# Patient Record
Sex: FEMALE | Race: WHITE | NOT HISPANIC OR LATINO | URBAN - METROPOLITAN AREA
[De-identification: names, ages, dates, MRNs, and addresses within clinical notes are randomized per-mention and may not be internally consistent; named-entity substitution may affect disease eponyms.]

---

## 2023-03-09 DIAGNOSIS — R92.8 ABNORMAL MAMMOGRAM: ICD-10-CM

## 2023-03-09 DIAGNOSIS — M25.569 KNEE PAIN, UNSPECIFIED CHRONICITY, UNSPECIFIED LATERALITY: ICD-10-CM

## 2023-03-09 DIAGNOSIS — R91.8 PULMONARY NODULES/LESIONS, MULTIPLE: Primary | ICD-10-CM

## 2023-03-15 DIAGNOSIS — Z00.00 HEALTH MAINTENANCE EXAMINATION: Primary | ICD-10-CM

## 2023-05-01 ENCOUNTER — LAB (OUTPATIENT)
Dept: LAB | Facility: LAB | Age: 73
End: 2023-05-01
Payer: MEDICARE

## 2023-05-01 DIAGNOSIS — Z00.00 HEALTH MAINTENANCE EXAMINATION: ICD-10-CM

## 2023-05-01 LAB
ALANINE AMINOTRANSFERASE (SGPT) (U/L) IN SER/PLAS: 14 U/L (ref 7–45)
ALBUMIN (G/DL) IN SER/PLAS: 4.5 G/DL (ref 3.4–5)
ALKALINE PHOSPHATASE (U/L) IN SER/PLAS: 76 U/L (ref 33–136)
ANION GAP IN SER/PLAS: 12 MMOL/L (ref 10–20)
ASPARTATE AMINOTRANSFERASE (SGOT) (U/L) IN SER/PLAS: 21 U/L (ref 9–39)
BASOPHILS (10*3/UL) IN BLOOD BY AUTOMATED COUNT: 0.04 X10E9/L (ref 0–0.1)
BASOPHILS/100 LEUKOCYTES IN BLOOD BY AUTOMATED COUNT: 1.1 % (ref 0–2)
BILIRUBIN TOTAL (MG/DL) IN SER/PLAS: 0.5 MG/DL (ref 0–1.2)
C REACTIVE PROTEIN (MG/L) IN SER/PLAS: <0.1 MG/DL
CALCIDIOL (25 OH VITAMIN D3) (NG/ML) IN SER/PLAS: 83 NG/ML
CALCIUM (MG/DL) IN SER/PLAS: 9.4 MG/DL (ref 8.6–10.6)
CARBON DIOXIDE, TOTAL (MMOL/L) IN SER/PLAS: 30 MMOL/L (ref 21–32)
CHLORIDE (MMOL/L) IN SER/PLAS: 103 MMOL/L (ref 98–107)
CHOLESTEROL (MG/DL) IN SER/PLAS: 242 MG/DL (ref 0–199)
CHOLESTEROL IN HDL (MG/DL) IN SER/PLAS: 81.3 MG/DL
CHOLESTEROL/HDL RATIO: 3
CREATININE (MG/DL) IN SER/PLAS: 0.75 MG/DL (ref 0.5–1.05)
EOSINOPHILS (10*3/UL) IN BLOOD BY AUTOMATED COUNT: 0.07 X10E9/L (ref 0–0.4)
EOSINOPHILS/100 LEUKOCYTES IN BLOOD BY AUTOMATED COUNT: 1.9 % (ref 0–6)
ERYTHROCYTE DISTRIBUTION WIDTH (RATIO) BY AUTOMATED COUNT: 12.5 % (ref 11.5–14.5)
ERYTHROCYTE MEAN CORPUSCULAR HEMOGLOBIN CONCENTRATION (G/DL) BY AUTOMATED: 32.4 G/DL (ref 32–36)
ERYTHROCYTE MEAN CORPUSCULAR VOLUME (FL) BY AUTOMATED COUNT: 96 FL (ref 80–100)
ERYTHROCYTES (10*6/UL) IN BLOOD BY AUTOMATED COUNT: 4.24 X10E12/L (ref 4–5.2)
GFR FEMALE: 84 ML/MIN/1.73M2
GLUCOSE (MG/DL) IN SER/PLAS: 85 MG/DL (ref 74–99)
HEMATOCRIT (%) IN BLOOD BY AUTOMATED COUNT: 40.7 % (ref 36–46)
HEMOGLOBIN (G/DL) IN BLOOD: 13.2 G/DL (ref 12–16)
IMMATURE GRANULOCYTES/100 LEUKOCYTES IN BLOOD BY AUTOMATED COUNT: 0.3 % (ref 0–0.9)
LDL: 137 MG/DL (ref 0–99)
LEUKOCYTES (10*3/UL) IN BLOOD BY AUTOMATED COUNT: 3.7 X10E9/L (ref 4.4–11.3)
LYMPHOCYTES (10*3/UL) IN BLOOD BY AUTOMATED COUNT: 1.3 X10E9/L (ref 0.8–3)
LYMPHOCYTES/100 LEUKOCYTES IN BLOOD BY AUTOMATED COUNT: 35.5 % (ref 13–44)
MONOCYTES (10*3/UL) IN BLOOD BY AUTOMATED COUNT: 0.24 X10E9/L (ref 0.05–0.8)
MONOCYTES/100 LEUKOCYTES IN BLOOD BY AUTOMATED COUNT: 6.6 % (ref 2–10)
MUCUS, URINE: NORMAL /LPF
NEUTROPHILS (10*3/UL) IN BLOOD BY AUTOMATED COUNT: 2 X10E9/L (ref 1.6–5.5)
NEUTROPHILS/100 LEUKOCYTES IN BLOOD BY AUTOMATED COUNT: 54.6 % (ref 40–80)
NRBC (PER 100 WBCS) BY AUTOMATED COUNT: 0 /100 WBC (ref 0–0)
PLATELETS (10*3/UL) IN BLOOD AUTOMATED COUNT: 222 X10E9/L (ref 150–450)
POTASSIUM (MMOL/L) IN SER/PLAS: 4.4 MMOL/L (ref 3.5–5.3)
PROTEIN TOTAL: 7.1 G/DL (ref 6.4–8.2)
RBC, URINE: 1 /HPF (ref 0–5)
SODIUM (MMOL/L) IN SER/PLAS: 141 MMOL/L (ref 136–145)
THYROTROPIN (MIU/L) IN SER/PLAS BY DETECTION LIMIT <= 0.05 MIU/L: 3.49 MIU/L (ref 0.44–3.98)
TRIGLYCERIDE (MG/DL) IN SER/PLAS: 119 MG/DL (ref 0–149)
UREA NITROGEN (MG/DL) IN SER/PLAS: 14 MG/DL (ref 6–23)
VLDL: 24 MG/DL (ref 0–40)
WBC, URINE: <1 /HPF (ref 0–5)

## 2023-05-01 PROCEDURE — 84443 ASSAY THYROID STIM HORMONE: CPT

## 2023-05-01 PROCEDURE — 85025 COMPLETE CBC W/AUTO DIFF WBC: CPT

## 2023-05-01 PROCEDURE — 86140 C-REACTIVE PROTEIN: CPT

## 2023-05-01 PROCEDURE — 81001 URINALYSIS AUTO W/SCOPE: CPT

## 2023-05-01 PROCEDURE — 80053 COMPREHEN METABOLIC PANEL: CPT

## 2023-05-01 PROCEDURE — 36415 COLL VENOUS BLD VENIPUNCTURE: CPT

## 2023-05-01 PROCEDURE — 80061 LIPID PANEL: CPT

## 2023-05-01 PROCEDURE — 82306 VITAMIN D 25 HYDROXY: CPT

## 2023-05-04 PROBLEM — Z00.00 ANNUAL PHYSICAL EXAM: Status: ACTIVE | Noted: 2023-05-04

## 2023-05-04 PROBLEM — M54.50 CHRONIC LOW BACK PAIN: Status: ACTIVE | Noted: 2019-03-04

## 2023-05-04 PROBLEM — M17.9 OSTEOARTHRITIS OF KNEE: Status: RESOLVED | Noted: 2023-05-04 | Resolved: 2023-05-04

## 2023-05-04 PROBLEM — M85.80 OSTEOPENIA: Status: ACTIVE | Noted: 2023-05-04

## 2023-05-04 PROBLEM — F98.8 ADD (ATTENTION DEFICIT DISORDER): Status: ACTIVE | Noted: 2023-05-04

## 2023-05-04 PROBLEM — N95.2 ATROPHIC VAGINITIS: Status: ACTIVE | Noted: 2023-05-04

## 2023-05-04 PROBLEM — E53.8 VITAMIN B12 DEFICIENCY: Status: ACTIVE | Noted: 2023-05-04

## 2023-05-04 PROBLEM — G89.29 CHRONIC LOW BACK PAIN: Status: ACTIVE | Noted: 2019-03-04

## 2023-05-04 PROBLEM — E78.5 HYPERLIPIDEMIA: Status: ACTIVE | Noted: 2023-05-04

## 2023-05-04 PROBLEM — F41.9 ANXIETY DISORDER: Status: ACTIVE | Noted: 2023-05-04

## 2023-05-04 PROBLEM — N36.2 URETHRAL CARUNCLE: Status: RESOLVED | Noted: 2021-07-27 | Resolved: 2023-05-04

## 2023-05-04 RX ORDER — SERTRALINE HYDROCHLORIDE 50 MG/1
50 TABLET, FILM COATED ORAL
COMMUNITY
Start: 2022-08-29 | End: 2024-05-20 | Stop reason: ALTCHOICE

## 2023-05-04 RX ORDER — CYANOCOBALAMIN 1000 UG/ML
1000 INJECTION, SOLUTION INTRAMUSCULAR; SUBCUTANEOUS
COMMUNITY

## 2023-05-04 RX ORDER — MV-MIN/FOLIC/K1/LYCOPEN/LUTEIN 300-60 MCG
1 TABLET ORAL DAILY
COMMUNITY
Start: 2010-08-17 | End: 2024-05-20 | Stop reason: ALTCHOICE

## 2023-05-04 RX ORDER — ESTRADIOL 0.1 MG/G
2 CREAM VAGINAL 2 TIMES WEEKLY
COMMUNITY
Start: 2020-08-13

## 2023-05-04 RX ORDER — BUPROPION HYDROCHLORIDE 150 MG/1
150 TABLET ORAL DAILY
COMMUNITY
End: 2023-11-21

## 2023-05-05 ENCOUNTER — OFFICE VISIT (OUTPATIENT)
Dept: PRIMARY CARE | Facility: CLINIC | Age: 73
End: 2023-05-05
Payer: MEDICARE

## 2023-05-05 VITALS
HEART RATE: 72 BPM | TEMPERATURE: 97.6 F | HEIGHT: 72 IN | BODY MASS INDEX: 20.52 KG/M2 | WEIGHT: 151.5 LBS | DIASTOLIC BLOOD PRESSURE: 68 MMHG | OXYGEN SATURATION: 98 % | SYSTOLIC BLOOD PRESSURE: 110 MMHG

## 2023-05-05 DIAGNOSIS — M85.80 OSTEOPENIA, UNSPECIFIED LOCATION: ICD-10-CM

## 2023-05-05 DIAGNOSIS — Z00.00 ANNUAL PHYSICAL EXAM: Primary | ICD-10-CM

## 2023-05-05 DIAGNOSIS — E78.5 HYPERLIPIDEMIA, UNSPECIFIED HYPERLIPIDEMIA TYPE: ICD-10-CM

## 2023-05-05 PROBLEM — F98.8 ADD (ATTENTION DEFICIT DISORDER): Status: RESOLVED | Noted: 2023-05-04 | Resolved: 2023-05-05

## 2023-05-05 PROBLEM — E53.8 VITAMIN B12 DEFICIENCY: Status: RESOLVED | Noted: 2023-05-04 | Resolved: 2023-05-05

## 2023-05-05 PROBLEM — F41.9 ANXIETY DISORDER: Status: RESOLVED | Noted: 2023-05-04 | Resolved: 2023-05-05

## 2023-05-05 PROBLEM — M54.50 CHRONIC LOW BACK PAIN: Status: RESOLVED | Noted: 2019-03-04 | Resolved: 2023-05-05

## 2023-05-05 PROBLEM — G89.29 CHRONIC LOW BACK PAIN: Status: RESOLVED | Noted: 2019-03-04 | Resolved: 2023-05-05

## 2023-05-05 PROCEDURE — 1160F RVW MEDS BY RX/DR IN RCRD: CPT | Performed by: INTERNAL MEDICINE

## 2023-05-05 PROCEDURE — UHSPHYS PR UH SELECT PHYSICAL: Performed by: INTERNAL MEDICINE

## 2023-05-05 PROCEDURE — 1159F MED LIST DOCD IN RCRD: CPT | Performed by: INTERNAL MEDICINE

## 2023-05-05 PROCEDURE — 1036F TOBACCO NON-USER: CPT | Performed by: INTERNAL MEDICINE

## 2023-05-05 NOTE — PROGRESS NOTES
"Physical Exam    Name Annie Golden    Date of Service :5/5/2023    Annie Golden is a 72 y.o. year old female who is being seen for a comprehensive exam    Her main health concerns today are:    Has had a couple of nose bleeds this year   Urethral carbuncle that was bleeding and had full   Saw Dr. Krueger for \"knocked knees\"   advised to hold off on any procedure for now on left knee   Saw Dr. 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  Last year was stressful from that standpoint     Patient Active Problem List   Diagnosis    Osteopenia    Hyperlipidemia    Atrophic vaginitis    Annual physical exam        Past Medical History:   Diagnosis Date    Abnormal uterine and vaginal bleeding, unspecified     ADD (attention deficit disorder) 05/04/2023    Anxiety disorder 05/04/2023    Chronic low back pain 03/04/2019    Osteoarthritis of knee     Urethral caruncle         History reviewed. No pertinent surgical history.     Social History     Tobacco Use    Smoking status: Never    Smokeless tobacco: Never   Substance Use Topics    Alcohol use: Yes     Alcohol/week: 3.0 standard drinks of alcohol     Types: 3 Glasses of wine per week    Drug use: Never      Social History     Social History Narrative     age 21,  to [place]ardo, 2 adult sons. 40,  41 both [employer] is family business. she was ex.  of [employer]. One son is going to leave the [employer].     spends the summer in [place]. late May and returns late October/early November     one son in [facility] and one in [place].  three granddaughters range from age 2 years to 8 years     lifelong non smoker    wine drinker with dinner     exercise routine- she stretches and walks regularly     Diet - balanced. low carbs, low snacking, lots of fish and chicken. fruits and vegetables          Family History   Problem Relation Name Age of Onset    Epilepsy Mother          " "d.96    Heart failure Mother      Schizophrenia Mother      Osteoporosis Mother      Hepatitis Father          d. 59 ( not alcohol)    Stroke Maternal Grandmother          d. 99    Stroke Maternal Grandfather      Heart attack Maternal Grandfather          d. 88            Current Outpatient Medications:     B complex-vitamin C-folic acid (Nephro-Nathan Rx) 1- mg-mg-mcg tablet, Take 1 tablet by mouth once daily with a meal., Disp: , Rfl:     buPROPion XL (Wellbutrin XL) 150 mg 24 hr tablet, Take 1 tablet (150 mg) by mouth once daily., Disp: , Rfl:     cyanocobalamin (Vitamin B-12) 1,000 mcg/mL injection, Inject 1 mL (1,000 mcg) into the shoulder, thigh, or buttocks every 30 (thirty) days., Disp: , Rfl:     L. acidophilus/Bifid. animalis 32 billion cell capsule, Take 1 Capful by mouth once daily., Disp: , Rfl:     multivit-min-FA-lycopen-lutein (Centrum Silver Ultra Men's) 300-600-300 mcg tablet, Take 1 tablet by mouth once daily., Disp: , Rfl:     sertraline (Zoloft) 50 mg tablet, Take 1 tablet (50 mg) by mouth once daily., Disp: , Rfl:     estradiol (Estrace) 0.01 % (0.1 mg/gram) vaginal cream, Insert 20 Applications into the vagina 2 times a week., Disp: , Rfl:     zoster vaccine-recombinant adjuvanted (Shingrix) 50 mcg/0.5 mL vaccine, Inject 0.5 mL into the shoulder, thigh, or buttocks 1 time for 1 dose., Disp: 0.5 mL, Rfl: 0    No Known Allergies    Review of Systems  sleeping well, mood okay, appetite good, weight stable, no cp, sob some recurrent nose bleeds this year     /68 (BP Location: Left arm, Patient Position: Sitting)   Pulse 72   Temp 36.4 °C (97.6 °F)   Ht 1.854 m (6' 1\")   Wt 68.7 kg (151 lb 8 oz)   SpO2 98%   BMI 19.99 kg/m²  Body mass index is 19.99 kg/m².    Physical Exam  Vitals reviewed.   Constitutional:       General: She is not in acute distress.     Appearance: Normal appearance. She is not ill-appearing.   HENT:      Right Ear: Tympanic membrane, ear canal and external ear " normal.      Left Ear: Tympanic membrane, ear canal and external ear normal.      Mouth/Throat:      Mouth: Mucous membranes are moist.      Pharynx: No oropharyngeal exudate or posterior oropharyngeal erythema.   Eyes:      Extraocular Movements: Extraocular movements intact.      Conjunctiva/sclera: Conjunctivae normal.      Pupils: Pupils are equal, round, and reactive to light.   Neck:      Vascular: No carotid bruit.   Cardiovascular:      Rate and Rhythm: Normal rate and regular rhythm.      Pulses: Normal pulses.      Heart sounds: Normal heart sounds. No murmur heard.     No gallop.   Pulmonary:      Effort: Pulmonary effort is normal. No respiratory distress.      Breath sounds: Normal breath sounds. No wheezing, rhonchi or rales.   Chest:   Breasts:     Right: Normal. No mass, nipple discharge or skin change.      Left: No mass, nipple discharge or skin change.   Abdominal:      General: Bowel sounds are normal. There is no distension.      Palpations: There is no mass.      Tenderness: There is no abdominal tenderness. There is no guarding.   Genitourinary:     Adnexa:         Right: No mass.          Left: No mass.     Musculoskeletal:         General: No swelling or tenderness. Normal range of motion.      Right lower leg: No edema.      Left lower leg: No edema.   Lymphadenopathy:      Cervical: No cervical adenopathy.      Upper Body:      Right upper body: No supraclavicular or axillary adenopathy.      Left upper body: No supraclavicular or axillary adenopathy.      Lower Body: No right inguinal adenopathy. No left inguinal adenopathy.   Skin:     General: Skin is warm and dry.      Findings: No rash.      Comments: No suspicious rashes  There is a scaly recurring lesion on her left shin    Neurological:      General: No focal deficit present.      Mental Status: She is alert.   Psychiatric:         Mood and Affect: Mood normal.         RESULTS/DATA:  Reviewed Standard Labs for this physical with  patient ( any significant issues addressed in A/P )     ECG:  Sinus Rhythm  RSR in V2      Assessment/Plan   Doing well overall  Discussed increasing aerobic exercise and following a heart healthy diet  She will obtain her shingles vaccine at the pharmacy and arrange to update her annual GYN exam    Advised her to get her skin lesion in her left shin biopsied.     Problem List Items Addressed This Visit       Osteopenia     Continue calcium, vitamin D and strength training          Hyperlipidemia     Discussed lifestyle changes  including increasing aerobic exercise  Will re check lipids in 6 months          Annual physical exam - Primary     Mammogram 4/27/23 Neg   Colonoscopy 5/10/22  No specimens collected repeat 10 years   Bone density- 3/27/22  lowest T score -1.4  left femoral neck   Pap test- per meet  will follow up     Shingles due   Update COVID booster in the fall   Annual Skin exam- up to date  Dentist- current   had root canal 6 months ( nose bleed)   Ophthalmologist- has appointment this afternoon Dr Romo          Relevant Medications    zoster vaccine-recombinant adjuvanted (Shingrix) 50 mcg/0.5 mL vaccine         Mana Rasmussen MD

## 2023-05-05 NOTE — PATIENT INSTRUCTIONS
Please update your shingles vaccine series at the pharmacy   Plan on updating your covid booster in the fall   Schedule with Dr. Leon for follow up     For management of your cholesterol and Blood pressure you should aim to follow the DASH diet. The DASH diet is  a combination diet rich in fruits, vegetables, legumes, and low-fat dairy products and low in snacks, sweets, meats, and saturated and total fat. The DASH diet is comprised of four to five servings of fruit, four to five servings of vegetables, two to three servings of low-fat dairy per day, and <25 percent fat.    Try to increase your aerobic exercise   Return in fall for recheck on your cholesterol     I would advise your recurring scaly skin lesion on your leg get biopsied and removed.

## 2023-05-05 NOTE — ASSESSMENT & PLAN NOTE
Discussed lifestyle changes  including increasing aerobic exercise  Will re check lipids in 6 months

## 2023-05-05 NOTE — ASSESSMENT & PLAN NOTE
Mammogram 4/27/23 Neg   Colonoscopy 5/10/22  No specimens collected repeat 10 years   Bone density- 3/27/22  lowest T score -1.4  left femoral neck   Pap test- per meet  will follow up     Shingles due   Update COVID booster in the fall   Annual Skin exam- up to date  Dentist- current   had root canal 6 months ( nose bleed)   Ophthalmologist- has appointment this afternoon Dr Romo

## 2023-10-30 PROBLEM — R91.8 MULTIPLE PULMONARY NODULES: Status: ACTIVE | Noted: 2023-10-30

## 2023-10-30 RX ORDER — CHOLECALCIFEROL (VITAMIN D3) 125 MCG
CAPSULE ORAL
COMMUNITY

## 2023-11-21 DIAGNOSIS — F41.9 ANXIETY DISORDER, UNSPECIFIED: ICD-10-CM

## 2023-11-21 RX ORDER — BUPROPION HYDROCHLORIDE 150 MG/1
150 TABLET ORAL DAILY
Qty: 90 TABLET | Refills: 3 | Status: SHIPPED | OUTPATIENT
Start: 2023-11-21

## 2024-01-22 ENCOUNTER — TELEPHONE (OUTPATIENT)
Dept: PRIMARY CARE | Facility: CLINIC | Age: 74
End: 2024-01-22
Payer: MEDICARE

## 2024-01-22 DIAGNOSIS — Z71.84 TRAVEL ADVICE ENCOUNTER: Primary | ICD-10-CM

## 2024-01-22 DIAGNOSIS — E78.5 HYPERLIPIDEMIA, UNSPECIFIED HYPERLIPIDEMIA TYPE: Primary | ICD-10-CM

## 2024-01-22 RX ORDER — OSELTAMIVIR PHOSPHATE 75 MG/1
75 CAPSULE ORAL 2 TIMES DAILY
Qty: 10 CAPSULE | Refills: 0 | Status: SHIPPED | OUTPATIENT
Start: 2024-01-22 | End: 2024-01-27

## 2024-01-22 RX ORDER — DIPHENOXYLATE HYDROCHLORIDE AND ATROPINE SULFATE 2.5; .025 MG/1; MG/1
1 TABLET ORAL 4 TIMES DAILY PRN
Qty: 21 TABLET | Refills: 0 | Status: SHIPPED | OUTPATIENT
Start: 2024-01-22 | End: 2024-01-29

## 2024-01-22 RX ORDER — AZITHROMYCIN 250 MG/1
TABLET, FILM COATED ORAL
Qty: 6 TABLET | Refills: 0 | Status: SHIPPED | OUTPATIENT
Start: 2024-01-22 | End: 2024-01-27

## 2024-01-22 NOTE — TELEPHONE ENCOUNTER
Hi Dr. Rasmussen,    1) Finished all my vaccinations. Got RSV today so necessary vaccinations up to date.   Covid booster, Shingles, Flu and RSV    2) About to travel to [place] Feb 9-Mar 10. When I plan to be out of the country for an extended time period I always like to have a couple “just in case meds” with me.   Tamaflu   Paxlovid?   Antibiotic   Severe stomach/digestive distress    Are these items you think I should have with me just in case and if so can you prescribe for me?    3) Heel pain just started up Isreal 15. Self diagnosed with plantar fasciitis (lol) Not severe pain just annoying ache. Worse in AM. Should I see podiatrist before I go? Worried since I anticipate I will do a lot of walking on my upcoming  trip. You sent me to a podiatrist awhile back for hammer toe pain which is better now. Can't find the referral name anywhere! Address is [address] [place] [address] [address] female doctor but no name in my calendar.    4) When I was in for my last physical you suggested adding statins since my cholesterol was inching up. Since I was reluctant to start those we agreed I would do blood work again in November which was never scheduled. So should I still do that now or wait until my annual physical early May (which I don't believe is on the schedule yet)    5) So late April early May I should be due for:   annual physical   bloodwork   mammogram   overall skin check     eye exam   obgyn    Phew!!  So time sensitive items are 2 and 3    Thanks!  Annie Golden                Sent from my iPad

## 2024-04-24 DIAGNOSIS — R91.8 MULTIPLE PULMONARY NODULES: Primary | ICD-10-CM

## 2024-04-26 DIAGNOSIS — Z00.00 HEALTHCARE MAINTENANCE: ICD-10-CM

## 2024-04-29 DIAGNOSIS — Z12.39 BREAST SCREENING: Primary | ICD-10-CM

## 2024-04-29 DIAGNOSIS — Z12.31 BREAST CANCER SCREENING BY MAMMOGRAM: ICD-10-CM

## 2024-05-10 ENCOUNTER — HOSPITAL ENCOUNTER (OUTPATIENT)
Dept: RADIOLOGY | Facility: CLINIC | Age: 74
Discharge: HOME | End: 2024-05-10
Payer: MEDICARE

## 2024-05-10 ENCOUNTER — APPOINTMENT (OUTPATIENT)
Dept: PRIMARY CARE | Facility: CLINIC | Age: 74
End: 2024-05-10
Payer: MEDICARE

## 2024-05-10 DIAGNOSIS — R91.8 MULTIPLE PULMONARY NODULES: ICD-10-CM

## 2024-05-10 PROCEDURE — 71250 CT THORAX DX C-: CPT

## 2024-05-10 PROCEDURE — 71250 CT THORAX DX C-: CPT | Performed by: RADIOLOGY

## 2024-05-13 ENCOUNTER — OFFICE VISIT (OUTPATIENT)
Dept: OBSTETRICS AND GYNECOLOGY | Facility: CLINIC | Age: 74
End: 2024-05-13
Payer: MEDICARE

## 2024-05-13 VITALS
SYSTOLIC BLOOD PRESSURE: 123 MMHG | WEIGHT: 156 LBS | DIASTOLIC BLOOD PRESSURE: 70 MMHG | HEIGHT: 72 IN | BODY MASS INDEX: 21.13 KG/M2 | HEART RATE: 72 BPM

## 2024-05-13 DIAGNOSIS — N36.2 URETHRAL CARUNCLE: Primary | ICD-10-CM

## 2024-05-13 DIAGNOSIS — N95.2 VAGINAL ATROPHY: ICD-10-CM

## 2024-05-13 PROCEDURE — 1126F AMNT PAIN NOTED NONE PRSNT: CPT | Performed by: OBSTETRICS & GYNECOLOGY

## 2024-05-13 PROCEDURE — 99213 OFFICE O/P EST LOW 20 MIN: CPT | Performed by: OBSTETRICS & GYNECOLOGY

## 2024-05-13 PROCEDURE — 1159F MED LIST DOCD IN RCRD: CPT | Performed by: OBSTETRICS & GYNECOLOGY

## 2024-05-13 ASSESSMENT — PAIN SCALES - GENERAL: PAINLEVEL: 0-NO PAIN

## 2024-05-13 NOTE — PROGRESS NOTES
Urogynecology  Provider:  Yomaira Aguilar MD  [phone]              ASSESSMENT AND PLAN:   74 y/o female presenting for routine exam.    Diagnoses:  #1 Annual exam    Plan:  Annual exam  - Patient is managing vaginal dryness with intermittent use of estrogen cream effectively.  > No bleeding or significant discomfort has been reported since her last use in March.  - Her urethral caruncle is stable with no signs of bleeding or abnormalities.  - Continue current management strategy.    Return in 1 year for an annual exam with Dr. Aguilar.    Scribe Attestation  By signing my name below, I, Linda Alcantar, attest that this documentation has been prepared under the direction and in the presence of Yomaira Aguilar MD on 05/13/2024 at 6:14 PM.    Agree with above. I Dr. Aguilar, personally performed the services described in the documentation which was scribed virtually and confirm it is both complete and accurate.  Yomaira Aguilar MD          Problem List Items Addressed This Visit    None          I spent a total of eConsult Time: 24 minutes in face to face and non face to face time.        Yomaira Aguilar MD        HISTORY OF PRESENT ILLNESS:     Last seen 5/2023  Assessment:   72 year old female being assessed for urethral caruncle     Diagnoses:   #1 urethral caruncle     Plan:   1.urethral caruncle  - Patient is doing well as she is no longer experiencing bleeding.   -It is fine for her to stay off of the estrogen if she is not bothered by the caruncle. She can restart the cream if needed. She uses pr currently when she is feeling some vaginal dryness/discomfort only. She has not had any vaginal bleeding recur.      Urinary Symptoms:   - She reported using estrogen cream for 2 weeks in March due to vaginal dryness and discomfort, which did resolve her sx. She has not used the estrogen cream since then.  - The pt mentioned that she had experienced bleeding in the past.       Past Medical  History:      Past Medical History:   Diagnosis Date    Abnormal uterine and vaginal bleeding, unspecified     ADD (attention deficit disorder) 05/04/2023    Anxiety disorder 05/04/2023    Chronic low back pain 03/04/2019    Osteoarthritis of knee     Urethral caruncle           Past Surgical History:       No past surgical history on file.      Medications:       Prior to Admission medications    Medication Sig Start Date End Date Taking? Authorizing Provider   B complex-vitamin C-folic acid (Nephro-Nathan Rx) 1- mg-mg-mcg tablet Take 1 tablet by mouth once daily with a meal.    Historical Provider, MD   buPROPion XL (Wellbutrin XL) 150 mg 24 hr tablet TAKE 1 TABLET BY MOUTH EVERY DAY 11/21/23   Mana Rasmussen MD   cholecalciferol (Vitamin D-3) 125 MCG (5000 UT) capsule Take by mouth.    Historical Provider, MD   cyanocobalamin (Vitamin B-12) 1,000 mcg/mL injection Inject 1 mL (1,000 mcg) into the shoulder, thigh, or buttocks every 30 (thirty) days.    Historical Provider, MD   estradiol (Estrace) 0.01 % (0.1 mg/gram) vaginal cream Insert 20 Applications into the vagina 2 times a week. 8/13/20   Historical Provider, MD   L. acidophilus/Bifid. animalis 32 billion cell capsule Take 1 Capful by mouth once daily.    Historical Provider, MD   multivit-min-FA-lycopen-lutein (Centrum Silver Ultra Men's) 300-600-300 mcg tablet Take 1 tablet by mouth once daily. 8/17/10   Historical Provider, MD   sertraline (Zoloft) 50 mg tablet Take 1 tablet (50 mg) by mouth once daily. 8/29/22   Historical ProviderMD BALL  Review of Systems     Blood, Urine   Date Value Ref Range Status   02/23/2022 NEGATIVE NEGATIVE Final     Nitrite, Urine   Date Value Ref Range Status   02/23/2022 NEGATIVE NEGATIVE Final     Urobilinogen, Urine   Date Value Ref Range Status   02/23/2022 <2.0 0.0 - 1.9 mg/dL Final         PHYSICAL EXAM:      There were no vitals taken for this visit.     No LMP recorded.      Declines chaperone for physical  exam.      Well developed, well nourished, in no apparent distress.   Neurologic/Psychiatric:  Awake, Alert and Oriented times 3.  Affect normal.     GENITAL/URINARY:       External Genitalia:  The patient has normal appearing external genitalia, normal skenes and bartholins glands, and a normal hair distribution.  Her vulva is without lesions, erythema or discharge.  It is non-tender with appropriate sensation.     Urethral Meatus:  Size normal, Location normal, Lesions absent, Prolapse absent,      Urethra:  Fullness absent, Masses absent,      Bladder:  Fullness absent, Masses absent, Tenderness absent,      Vagina:  General appearance normal, Estrogen effect normal, Discharge absent, Lesions absent,      Cervix: Normal, no discharge.   Uterus:  normal size and mobile  Adnexa: normal and bilateral     5 mm urethral caruncle was noted, which is intact with no bleeding or abnormalities and appears unchanged. The rest of the perineum is within normal limits.    Reassuring exam.      Data and DIAGNOSTIC STUDIES REVIEWED   CT chest wo IV contrast    Result Date: 5/12/2024  Interpreted By:  Mazin Shannon and Marshall Colin STUDY: CT CHEST WO IV CONTRAST;  5/10/2024 11:42 am   INDICATION: Signs/Symptoms:follow up for multiple pulmonary nodules.   COMPARISON: CT chest dated 04/27/2023 and 11/10/2022. CT cardiac scoring dated 05/13/2022.   ACCESSION NUMBER(S): [id number]   ORDERING CLINICIAN: KIRT BURROWS   TECHNIQUE: Helical data acquisition of the chest was obtained  without IV contrast material.  Images were reformatted in axial, coronal, and sagittal planes.   FINDINGS: LUNGS AND AIRWAYS: The trachea and central airways are patent. No endobronchial lesion. There are multifocal areas of distal bronchial mucous plugging, similar to prior exams.   Stable size of a 0.6 cm solid nodule within the right lower lobe as compared with exams dating back to 05/13/2022 (series 3, image 212). Stable size of a 0.3 cm left lower  lobe pulmonary nodule (image 197). Stable size of a 0.7 cm right lower lobe pulmonary nodule (image 182). No new or enlarging discrete suspicious pulmonary nodules.   No new focal consolidation, pleural effusion, or pneumothorax. Biapical pleuroparenchymal scarring is again noted.   MEDIASTINUM AND NIKHIL, LOWER NECK AND AXILLA: The visualized thyroid gland is within normal limits.   No evidence of thoracic lymphadenopathy by CT criteria.   Esophagus appears within normal limits as seen.   HEART AND VESSELS: The thoracic aorta is of normal course and caliber without vascular calcifications.   Main pulmonary artery and its branches are normal in caliber.   Moderate coronary artery calcifications are seen. The study is not optimized for evaluation of coronary arteries.   The cardiac chambers are not enlarged.   No evidence of pericardial effusion.   UPPER ABDOMEN: Unchanged size of a 1 cm simple fluid attenuating cysts within the hepatic dome. Otherwise, the partially visualized subdiaphragmatic structures are unremarkable in appearance.   CHEST WALL AND OSSEOUS STRUCTURES: The chest wall soft tissues are unremarkable. No acute osseous abnormalities or suspicious osseous lesions. Multiple remote right posterolateral rib fractures are again noted. Mild discogenic degenerative changes are again noted throughout the thoracic spine with intervertebral disc space narrowing and vertebral body osteophytosis.       1.  Stable size of a few subcentimeter pulmonary nodules measuring up to 0.7 cm as described above and compared with exams dating back to 05/13/2022, likely benign given their stability over time. However, if patient has high risk factors for lung cancer, consider annual low-dose CT chest follow-up for further evaluation. 2. No new acute cardiopulmonary process. 3. Moderate coronary artery calcifications.   I personally reviewed the images/study and I agree with the resident findings as stated. This study was  "interpreted at [facility], [place], [place].   MACRO: None   Signed by: Mazin Shannon 5/12/2024 7:35 PM Dictation workstation:   [id number]     No results found for: \"URINECULTURE\", \"[employer]COMM\"   Lab Results   Component Value Date    GLUCOSE 85 05/01/2023    CALCIUM 9.4 05/01/2023     05/01/2023    K 4.4 05/01/2023    CO2 30 05/01/2023     05/01/2023    BUN 14 05/01/2023    CREATININE 0.75 05/01/2023     Lab Results   Component Value Date    WBC 3.7 (L) 05/01/2023    HGB 13.2 05/01/2023    HCT 40.7 05/01/2023    MCV 96 05/01/2023     05/01/2023          Yomaira Aguilar MD        "

## 2024-05-14 ENCOUNTER — LAB (OUTPATIENT)
Dept: LAB | Facility: LAB | Age: 74
End: 2024-05-14
Payer: MEDICARE

## 2024-05-14 DIAGNOSIS — E78.5 HYPERLIPIDEMIA, UNSPECIFIED HYPERLIPIDEMIA TYPE: ICD-10-CM

## 2024-05-14 DIAGNOSIS — Z00.00 HEALTHCARE MAINTENANCE: ICD-10-CM

## 2024-05-14 LAB
25(OH)D3 SERPL-MCNC: 91 NG/ML (ref 30–100)
ALBUMIN SERPL BCP-MCNC: 4.3 G/DL (ref 3.4–5)
ALP SERPL-CCNC: 79 U/L (ref 33–136)
ALT SERPL W P-5'-P-CCNC: 17 U/L (ref 7–45)
ANION GAP SERPL CALC-SCNC: 13 MMOL/L (ref 10–20)
APPEARANCE UR: CLEAR
AST SERPL W P-5'-P-CCNC: 21 U/L (ref 9–39)
BASOPHILS # BLD AUTO: 0.05 X10*3/UL (ref 0–0.1)
BASOPHILS NFR BLD AUTO: 1.2 %
BILIRUB SERPL-MCNC: 0.7 MG/DL (ref 0–1.2)
BILIRUB UR STRIP.AUTO-MCNC: NEGATIVE MG/DL
BUN SERPL-MCNC: 13 MG/DL (ref 6–23)
CALCIUM SERPL-MCNC: 9.3 MG/DL (ref 8.6–10.6)
CHLORIDE SERPL-SCNC: 102 MMOL/L (ref 98–107)
CHOLEST SERPL-MCNC: 234 MG/DL (ref 0–199)
CHOLESTEROL/HDL RATIO: 3.1
CO2 SERPL-SCNC: 28 MMOL/L (ref 21–32)
COLOR UR: NORMAL
CREAT SERPL-MCNC: 0.62 MG/DL (ref 0.5–1.05)
CRP SERPL HS-MCNC: 0.6 MG/L
EGFRCR SERPLBLD CKD-EPI 2021: >90 ML/MIN/1.73M*2
EOSINOPHIL # BLD AUTO: 0.1 X10*3/UL (ref 0–0.4)
EOSINOPHIL NFR BLD AUTO: 2.4 %
ERYTHROCYTE [DISTWIDTH] IN BLOOD BY AUTOMATED COUNT: 12.2 % (ref 11.5–14.5)
EST. AVERAGE GLUCOSE BLD GHB EST-MCNC: 111 MG/DL
GLUCOSE SERPL-MCNC: 83 MG/DL (ref 74–99)
GLUCOSE UR STRIP.AUTO-MCNC: NORMAL MG/DL
HBA1C MFR BLD: 5.5 %
HCT VFR BLD AUTO: 40.2 % (ref 36–46)
HDLC SERPL-MCNC: 76.4 MG/DL
HGB BLD-MCNC: 13 G/DL (ref 12–16)
HOLD SPECIMEN: NORMAL
IMM GRANULOCYTES # BLD AUTO: 0.01 X10*3/UL (ref 0–0.5)
IMM GRANULOCYTES NFR BLD AUTO: 0.2 % (ref 0–0.9)
KETONES UR STRIP.AUTO-MCNC: NEGATIVE MG/DL
LDLC SERPL CALC-MCNC: 129 MG/DL
LEUKOCYTE ESTERASE UR QL STRIP.AUTO: NEGATIVE
LYMPHOCYTES # BLD AUTO: 1.54 X10*3/UL (ref 0.8–3)
LYMPHOCYTES NFR BLD AUTO: 36.6 %
MCH RBC QN AUTO: 31 PG (ref 26–34)
MCHC RBC AUTO-ENTMCNC: 32.3 G/DL (ref 32–36)
MCV RBC AUTO: 96 FL (ref 80–100)
MONOCYTES # BLD AUTO: 0.31 X10*3/UL (ref 0.05–0.8)
MONOCYTES NFR BLD AUTO: 7.4 %
NEUTROPHILS # BLD AUTO: 2.2 X10*3/UL (ref 1.6–5.5)
NEUTROPHILS NFR BLD AUTO: 52.2 %
NITRITE UR QL STRIP.AUTO: NEGATIVE
NON HDL CHOLESTEROL: 158 MG/DL (ref 0–149)
NRBC BLD-RTO: 0 /100 WBCS (ref 0–0)
PH UR STRIP.AUTO: 6 [PH]
PLATELET # BLD AUTO: 253 X10*3/UL (ref 150–450)
POTASSIUM SERPL-SCNC: 4.4 MMOL/L (ref 3.5–5.3)
PROT SERPL-MCNC: 6.7 G/DL (ref 6.4–8.2)
PROT UR STRIP.AUTO-MCNC: NEGATIVE MG/DL
RBC # BLD AUTO: 4.19 X10*6/UL (ref 4–5.2)
RBC # UR STRIP.AUTO: NEGATIVE /UL
SODIUM SERPL-SCNC: 139 MMOL/L (ref 136–145)
SP GR UR STRIP.AUTO: 1.01
TRIGL SERPL-MCNC: 141 MG/DL (ref 0–149)
TSH SERPL-ACNC: 2.41 MIU/L (ref 0.44–3.98)
UROBILINOGEN UR STRIP.AUTO-MCNC: NORMAL MG/DL
VLDL: 28 MG/DL (ref 0–40)
WBC # BLD AUTO: 4.2 X10*3/UL (ref 4.4–11.3)

## 2024-05-14 PROCEDURE — 81003 URINALYSIS AUTO W/O SCOPE: CPT

## 2024-05-14 PROCEDURE — 85025 COMPLETE CBC W/AUTO DIFF WBC: CPT

## 2024-05-14 PROCEDURE — 80061 LIPID PANEL: CPT

## 2024-05-14 PROCEDURE — 84443 ASSAY THYROID STIM HORMONE: CPT

## 2024-05-14 PROCEDURE — 83036 HEMOGLOBIN GLYCOSYLATED A1C: CPT

## 2024-05-14 PROCEDURE — 86141 C-REACTIVE PROTEIN HS: CPT

## 2024-05-14 PROCEDURE — 82306 VITAMIN D 25 HYDROXY: CPT

## 2024-05-14 PROCEDURE — 36415 COLL VENOUS BLD VENIPUNCTURE: CPT

## 2024-05-14 PROCEDURE — 80053 COMPREHEN METABOLIC PANEL: CPT

## 2024-05-15 ENCOUNTER — HOSPITAL ENCOUNTER (OUTPATIENT)
Dept: RADIOLOGY | Facility: CLINIC | Age: 74
Discharge: HOME | End: 2024-05-15
Payer: MEDICARE

## 2024-05-15 VITALS — HEIGHT: 72 IN | BODY MASS INDEX: 21.13 KG/M2 | WEIGHT: 156 LBS

## 2024-05-15 DIAGNOSIS — Z12.31 BREAST CANCER SCREENING BY MAMMOGRAM: ICD-10-CM

## 2024-05-15 DIAGNOSIS — Z12.39 BREAST SCREENING: ICD-10-CM

## 2024-05-15 PROCEDURE — 77067 SCR MAMMO BI INCL CAD: CPT

## 2024-05-15 PROCEDURE — 77063 BREAST TOMOSYNTHESIS BI: CPT | Performed by: RADIOLOGY

## 2024-05-15 PROCEDURE — 77067 SCR MAMMO BI INCL CAD: CPT | Performed by: RADIOLOGY

## 2024-05-17 PROBLEM — N93.9 ABNORMAL VAGINAL BLEEDING: Status: RESOLVED | Noted: 2024-05-17 | Resolved: 2024-05-17

## 2024-05-17 NOTE — PROGRESS NOTES
Physical Exam    Name Annie Golden    Date of Service :5/20/2024    Annie Golden is a 73 y.o. year old female who is being seen for a comprehensive exam  HLD CT cardiac score 78 in LAD 5/13/22  Osteopenia   Urethral carbuncle - saw Dr. Aguilar     Multiple pulmonary nodules- non smoker  CT Chest 5/10/24  stable for 2 years   no follow up needed     Her main health concerns today  Cholesterol discussion  Left knee- lateral pain.  Did see specialist.  Doesn't interfere with her activities   Plantar fascitis -  will avoid going barefoot   Indigestion- occ. If drinking red wine or eating too much at restaurant.     Patient Active Problem List   Diagnosis    Osteopenia    Hyperlipidemia    Atrophic vaginitis    Annual physical exam    Multiple pulmonary nodules        Past Medical History:   Diagnosis Date    Abnormal uterine and vaginal bleeding, unspecified     ADD (attention deficit disorder) 05/04/2023    Anxiety disorder 05/04/2023    Chronic low back pain 03/04/2019    Osteoarthritis of knee     Urethral caruncle         History reviewed. No pertinent surgical history.     Social History     Tobacco Use    Smoking status: Never    Smokeless tobacco: Never   Substance Use Topics    Alcohol use: Yes     Alcohol/week: 3.0 standard drinks of alcohol     Types: 3 Glasses of wine per week    Drug use: Never      Social History     Social History Narrative     age 21,  to Medardo, 2 adult sons. 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 she was ex.  of [employer]. One son is going to leave the [employer].     spends the summer in [place]. late May and returns late October/early November     one son in [facility] and one in [place].  three granddaughters range from age 2 years to 8 years     lifelong non smoker    wine drinker with dinner     exercise routine- she stretches and walks regularly     Diet - balanced. low carbs, low snacking, lots of fish and chicken. fruits and  "vegetables        Family History   Problem Relation Name Age of Onset    Epilepsy Mother          d.96    Heart failure Mother      Schizophrenia Mother      Osteoporosis Mother      Hepatitis Father          d. 59 ( not alcohol)    Stroke Maternal Grandmother          d. 99    Stroke Maternal Grandfather      Heart attack Maternal Grandfather          d. 88          Current Outpatient Medications:     B complex-vitamin C-folic acid (Nephro-Nathan Rx) 1- mg-mg-mcg tablet, Take 1 tablet by mouth once daily with breakfast., Disp: , Rfl:     buPROPion XL (Wellbutrin XL) 150 mg 24 hr tablet, TAKE 1 TABLET BY MOUTH EVERY DAY, Disp: 90 tablet, Rfl: 3    cholecalciferol (Vitamin D-3) 125 MCG (5000 UT) capsule, Take by mouth., Disp: , Rfl:     cyanocobalamin (Vitamin B-12) 1,000 mcg/mL injection, Inject 1 mL (1,000 mcg) into the muscle every 30 (thirty) days., Disp: , Rfl:     estradiol (Estrace) 0.01 % (0.1 mg/gram) vaginal cream, Insert 0.5 Applicatorfuls (2 g) into the vagina 2 times a week., Disp: , Rfl:     L. acidophilus/Bifid. animalis 32 billion cell capsule, Take 1 Capful by mouth once daily., Disp: , Rfl:     rosuvastatin (Crestor) 5 mg tablet, Take 1 tablet (5 mg) by mouth once daily at bedtime., Disp: 90 tablet, Rfl: 3    No Known Allergies    Review of Systems     /73 (BP Location: Left arm, Patient Position: Sitting)   Pulse 80   Temp 36.5 °C (97.7 °F)   Ht 1.854 m (6' 1\")   Wt 70.4 kg (155 lb 4 oz)   SpO2 98%   BMI 20.48 kg/m²  Body mass index is 20.48 kg/m².    Physical Exam  Vitals reviewed.   Constitutional:       General: She is not in acute distress.     Appearance: Normal appearance.   HENT:      Right Ear: Tympanic membrane and external ear normal.      Left Ear: Tympanic membrane and external ear normal.      Ears:      Comments: Slightly decreased hearing left >right   Eyes:      Extraocular Movements: Extraocular movements intact.   Neck:      Vascular: No carotid bruit. "   Cardiovascular:      Rate and Rhythm: Normal rate and regular rhythm.      Pulses: Normal pulses.      Heart sounds: Normal heart sounds. No murmur heard.     No gallop.   Pulmonary:      Effort: Pulmonary effort is normal. No respiratory distress.      Breath sounds: Normal breath sounds. No wheezing, rhonchi or rales.   Chest:   Breasts:     Right: Normal.      Left: Normal.   Abdominal:      General: Bowel sounds are normal. There is no distension.      Palpations: There is no mass.      Tenderness: There is no abdominal tenderness. There is no guarding.   Musculoskeletal:         General: No swelling or tenderness. Normal range of motion.      Right lower leg: No edema.      Left lower leg: No edema.   Lymphadenopathy:      Cervical: No cervical adenopathy.      Upper Body:      Right upper body: No supraclavicular or axillary adenopathy.      Left upper body: No supraclavicular or axillary adenopathy.      Lower Body: No right inguinal adenopathy. No left inguinal adenopathy.   Skin:     General: Skin is warm and dry.      Findings: No rash.   Neurological:      General: No focal deficit present.      Mental Status: She is alert.   Psychiatric:         Mood and Affect: Mood normal.         RESULTS/DATA:  Reviewed Standard Labs for this physical with patient ( any significant issues addressed in A/P )     ECG: normal sinus rhythm, no blocks or conduction defects, no ischemic changes.       Assessment/Plan You are doing very well!  We discussed optimizing lowering your cardiovascular risk which includes increasing your walking and aerobic exercise and optimizing your cholesterol   Start crestor 5 mg at night   Update your covid booster   For plantar fascitis avoid going bare feet  For your left knee pain- consider Physical Therapy   Your bone density is due. Ordered today  Increase your walking for bone health and overall cardiovascular health as above     Problem List Items Addressed This Visit        Hyperlipidemia    Relevant Medications    rosuvastatin (Crestor) 5 mg tablet    Annual physical exam - Primary    Relevant Orders    ECG 12 lead (Clinic Performed)     Other Visit Diagnoses       Asymptomatic menopause        Relevant Orders    XR DEXA bone density            ROUTINE:     Immunizations  current   got rsv , covid last fall     Mammogram: last completed 5/15/24- neg   GYN EXAM: per meet  5/13/24  COLONOSCOPY:  5/10/22 No specimens collected repeat 10 years   DEXA:   3/27/22 lowest T score -1.4 left femoral neck  ( calcium, vitamin D and strength training)     OPHTHALMOLOGY:  few weeks ago.  Current getting cataracts. Dr. Magnolia Romo ( Mom had glaucoma)   DERMATOLOGY- Dr. Mena. Current   DENTISTRY: current     CT cardiac score 78 in LAD 5/13/22    Mana Rasmussen MD

## 2024-05-20 ENCOUNTER — OFFICE VISIT (OUTPATIENT)
Dept: PRIMARY CARE | Facility: CLINIC | Age: 74
End: 2024-05-20
Payer: MEDICARE

## 2024-05-20 VITALS
BODY MASS INDEX: 21.03 KG/M2 | OXYGEN SATURATION: 98 % | SYSTOLIC BLOOD PRESSURE: 118 MMHG | DIASTOLIC BLOOD PRESSURE: 73 MMHG | WEIGHT: 155.25 LBS | HEIGHT: 72 IN | HEART RATE: 80 BPM | TEMPERATURE: 97.7 F

## 2024-05-20 VITALS
BODY MASS INDEX: 21.03 KG/M2 | TEMPERATURE: 97.7 F | DIASTOLIC BLOOD PRESSURE: 73 MMHG | SYSTOLIC BLOOD PRESSURE: 118 MMHG | HEART RATE: 80 BPM | HEIGHT: 72 IN | OXYGEN SATURATION: 98 % | WEIGHT: 155.25 LBS

## 2024-05-20 DIAGNOSIS — Z78.0 ASYMPTOMATIC MENOPAUSE: ICD-10-CM

## 2024-05-20 DIAGNOSIS — Z00.00 MEDICARE ANNUAL WELLNESS VISIT, SUBSEQUENT: Primary | ICD-10-CM

## 2024-05-20 DIAGNOSIS — Z00.00 ANNUAL PHYSICAL EXAM: Primary | ICD-10-CM

## 2024-05-20 DIAGNOSIS — E78.5 HYPERLIPIDEMIA, UNSPECIFIED HYPERLIPIDEMIA TYPE: ICD-10-CM

## 2024-05-20 PROCEDURE — 93000 ELECTROCARDIOGRAM COMPLETE: CPT | Performed by: INTERNAL MEDICINE

## 2024-05-20 PROCEDURE — 1160F RVW MEDS BY RX/DR IN RCRD: CPT | Performed by: INTERNAL MEDICINE

## 2024-05-20 PROCEDURE — G0439 PPPS, SUBSEQ VISIT: HCPCS | Performed by: INTERNAL MEDICINE

## 2024-05-20 PROCEDURE — UHSPHYS PR UH SELECT PHYSICAL: Performed by: INTERNAL MEDICINE

## 2024-05-20 PROCEDURE — 1036F TOBACCO NON-USER: CPT | Performed by: INTERNAL MEDICINE

## 2024-05-20 PROCEDURE — 1159F MED LIST DOCD IN RCRD: CPT | Performed by: INTERNAL MEDICINE

## 2024-05-20 RX ORDER — ROSUVASTATIN CALCIUM 5 MG/1
5 TABLET, COATED ORAL NIGHTLY
Qty: 90 TABLET | Refills: 3 | Status: SHIPPED | OUTPATIENT
Start: 2024-05-20 | End: 2025-05-20

## 2024-05-20 NOTE — PROGRESS NOTES
Chief Complaint: Medicare Wellness Exam/Comprehensive Problem Focused Follow Up and Physical Exam    HPI   Here for annual medicare wellness. Feels well.       Active Problem List  Patient Active Problem List   Diagnosis    Osteopenia    Hyperlipidemia    Atrophic vaginitis    Medicare annual wellness visit, subsequent    Multiple pulmonary nodules        Comprehensive Medical/Surgical/Social/Family History  Past Medical History:   Diagnosis Date    Abnormal uterine and vaginal bleeding, unspecified     ADD (attention deficit disorder) 05/04/2023    Anxiety disorder 05/04/2023    Chronic low back pain 03/04/2019    Osteoarthritis of knee     Urethral caruncle      No past surgical history on file.  Social History     Social History Narrative     age 21,  to Medardo, 2 adult sons. 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 she was ex. [employer]. One son is going to leave the [employer].     spends the summer in [place]. late May and returns late October/early November     one son in [facility] and one in [place].  three granddaughters range from age 2 years to 8 years     lifelong non smoker    wine drinker with dinner     exercise routine- she stretches and walks regularly     Diet - balanced. low carbs, low snacking, lots of fish and chicken. fruits and vegetables          Allergies and Medications  Patient has no known allergies.  Current Outpatient Medications on File Prior to Visit   Medication Sig Dispense Refill    B complex-vitamin C-folic acid (Nephro-Nathan Rx) 1- mg-mg-mcg tablet Take 1 tablet by mouth once daily with breakfast.      buPROPion XL (Wellbutrin XL) 150 mg 24 hr tablet TAKE 1 TABLET BY MOUTH EVERY DAY 90 tablet 3    cholecalciferol (Vitamin D-3) 125 MCG (5000 UT) capsule Take by mouth.      cyanocobalamin (Vitamin B-12) 1,000 mcg/mL injection Inject 1 mL (1,000 mcg) into the muscle every 30 (thirty) days.      estradiol (Estrace) 0.01 %  "(0.1 mg/gram) vaginal cream Insert 0.5 Applicatorfuls (2 g) into the vagina 2 times a week.      L. acidophilus/Bifid. animalis 32 billion cell capsule Take 1 Capful by mouth once daily.      rosuvastatin (Crestor) 5 mg tablet Take 1 tablet (5 mg) by mouth once daily at bedtime. 90 tablet 3    [DISCONTINUED] multivit-min-FA-lycopen-lutein (Centrum Silver Ultra Men's) 300-600-300 mcg tablet Take 1 tablet by mouth once daily.      [DISCONTINUED] sertraline (Zoloft) 50 mg tablet Take 1 tablet (50 mg) by mouth once daily.       No current facility-administered medications on file prior to visit.       Medications and Supplements  prescribed by me and other practitioners or clinical pharmacist (such as prescriptions, OTC's, herbal therapies and supplements) were reviewed and documented in the medical record.    Tobacco/Alcohol/Opioid use, as well as Illicit Drug Use was screened for/reviewed and documented in Social History section and medication list as appropriate  Activities of Daily Living  In your present state of health, do you have any difficulty performing the following activities?:   Preparing food and eating?: No  Bathing yourself: No  Getting dressed: No  Using the toilet:No  Moving around from place to place: No  In the past year have you fallen or had a near fall?:No    Depression Screen  (Note: if answer to either of the following is \"Yes\", then a more complete depression screening is indicated)   Q1: Over the past two weeks, have you felt down, depressed or hopeless? No  Q2: Over the past two weeks, have you felt little interest or pleasure in doing things? No    Current exercise habits: The patient does not participate in regular exercise at present.   Dietary issues discussed: Yes  Hearing difficulties: No  Safe in current home environment: yes  Visual Acuity assessed: yes  ( went to the ophthalmologist a few weeks ago)   Cognitive Impairment assessed: yes           Cardiac Risk " "Assessment  Cardiovascular risk was discussed and, if needed, lifestyle modifications recommended, including nutritional choices, exercise, and elimination of habits contributing to risk. We agreed on a plan to reduce the current cardiovascular risk based on above discussion as needed.  Aspirin use/disuse was discussed after reviewing the updated guidelines below:    Vitals  /73 (BP Location: Left arm, Patient Position: Sitting)   Pulse 80   Temp 36.5 °C (97.7 °F)   Ht 1.854 m (6' 1\")   Wt 70.4 kg (155 lb 4 oz)   SpO2 98%   BMI 20.48 kg/m²   Body mass index is 20.48 kg/m².  Physical Exam  Gen: Alert, NAD  HEENT:  PERRLA, EOMI, conjunctiva and sclera normal in appearance. External auditory canals/TMs normal; Oral cavity and posterior pharynx without lesions/exudate  Neck:  Supple with FROM; No masses/nodes palpable; Thyroid nontender and without nodules; No SANTA  Respiratory:  Lungs CTAB  Cardiovascular:  Heart RRR. No M/R/G. Peripheral pulses equal bilaterally  Abdomen:  Soft, nontender, BS present throughout; No R/G/R; No HSM or masses palpated  Extremities:  FROM all extremities; Muscle strength grossly normal with good tone  Neuro:  CN II-XII intact; Reflexes 2+/2+; Gross motor and sensory intact  Skin:  No suspicious lesions present  Breast: No masses, skin lesions or nipple discharges, no axillary lymphadenopathy    Assessment and Plan:  No problem-specific Assessment & Plan notes found for this encounter.     Problem List Items Addressed This Visit       Medicare annual wellness visit, subsequent - Primary      You are doing very well!  We discussed optimizing lowering your cardiovascular risk which includes increasing your walking and aerobic exercise and optimizing your cholesterol   Start crestor 5 mg at night   Update your covid booster   For plantar fascitis avoid going bare feet  For your left knee pain- consider Physical Therapy   Your bone density is due. Ordered today  Increase your walking " for bone health and overall cardiovascular health as above     During the course of the visit the patient was educated and counseled about age appropriate screening and preventive services. Completed preventive screenings were documented in the chart and orders were placed for outstanding screenings/procedures as documented in the Assessment and Plan.      Patient Instructions (the written plan) was given to the patient at check out.      Mana Rasmussen MD

## 2024-05-20 NOTE — PATIENT INSTRUCTIONS
You are doing very well!  We discussed optimizing lowering your cardiovascular risk which includes increasing your walking and aerobic exercise and optimizing your cholesterol   Start crestor 5 mg at night   Update your covid booster   For plantar fascitis avoid going bare feet  For your left knee pain- consider Physical Therapy   Your bone density is due. Ordered today  Increase your walking for bone health and overall cardiovascular health as above

## 2024-05-23 ENCOUNTER — HOSPITAL ENCOUNTER (OUTPATIENT)
Dept: RADIOLOGY | Facility: CLINIC | Age: 74
Discharge: HOME | End: 2024-05-23
Payer: MEDICARE

## 2024-05-23 DIAGNOSIS — Z78.0 ASYMPTOMATIC MENOPAUSE: ICD-10-CM

## 2024-05-23 PROCEDURE — 77080 DXA BONE DENSITY AXIAL: CPT

## 2024-05-23 PROCEDURE — 77080 DXA BONE DENSITY AXIAL: CPT | Performed by: RADIOLOGY

## 2024-06-16 ENCOUNTER — TELEPHONE (OUTPATIENT)
Dept: PRIMARY CARE | Facility: CLINIC | Age: 74
End: 2024-06-16
Payer: MEDICARE

## 2024-06-26 ENCOUNTER — OFFICE VISIT (OUTPATIENT)
Dept: PRIMARY CARE | Facility: CLINIC | Age: 74
End: 2024-06-26
Payer: MEDICARE

## 2024-06-26 VITALS
TEMPERATURE: 97.2 F | HEART RATE: 65 BPM | OXYGEN SATURATION: 96 % | SYSTOLIC BLOOD PRESSURE: 143 MMHG | DIASTOLIC BLOOD PRESSURE: 79 MMHG

## 2024-06-26 DIAGNOSIS — E78.5 HYPERLIPIDEMIA, UNSPECIFIED HYPERLIPIDEMIA TYPE: Primary | ICD-10-CM

## 2024-06-26 PROCEDURE — 99212 OFFICE O/P EST SF 10 MIN: CPT | Performed by: INTERNAL MEDICINE

## 2024-06-26 RX ORDER — PRAVASTATIN SODIUM 20 MG/1
20 TABLET ORAL NIGHTLY
Qty: 30 TABLET | Refills: 0 | Status: SHIPPED | OUTPATIENT
Start: 2024-06-26 | End: 2024-07-26

## 2024-06-26 NOTE — PROGRESS NOTES
Patient here for suture removal.  Left hand third finger 3 stiches removed.      Left hand finger  Area was cleaned with alcohol, removed 3 blues sutures, dry cleaned, bacitracin and band aid applied.  Patient tolerated well.

## 2024-06-26 NOTE — PROGRESS NOTES
Subjective   Patient ID: Annie Golden is a 74 y.o. female who presents for Follow-up.    HPI   Here for suture removal of three sutures in her fourth finger. She also has questions about her cholesterol medication ( Crestor 5 mg) .  She is less achy since being off of it for the past two weeks.    Review of Systems    Objective   /79 (BP Location: Left arm, Patient Position: Sitting)   Pulse 65   Temp 36.2 °C (97.2 °F)   SpO2 96%     Physical Exam  Finger wound appears well approximated and healing well.  Sutures were removed without difficulty and the wound cleaned and dressed with bacitracin and bandage.      Assessment/Plan   Continue wound care and watch for any signs of infection such as increased pain, swelling or redness.   Return for any concerns    Myalgias secondary to her Crestor- improved off.  Will trial Pravastatin and  see if she tolerates this better   Problem List Items Addressed This Visit             ICD-10-CM    Hyperlipidemia - Primary E78.5    Relevant Medications    pravastatin (Pravachol) 20 mg tablet

## 2024-06-27 ENCOUNTER — APPOINTMENT (OUTPATIENT)
Dept: PRIMARY CARE | Facility: CLINIC | Age: 74
End: 2024-06-27
Payer: MEDICARE

## 2024-09-16 DIAGNOSIS — F41.9 ANXIETY DISORDER, UNSPECIFIED: ICD-10-CM

## 2024-09-16 RX ORDER — BUPROPION HYDROCHLORIDE 150 MG/1
150 TABLET ORAL DAILY
Qty: 90 TABLET | Refills: 3 | Status: SHIPPED | OUTPATIENT
Start: 2024-09-16 | End: 2025-09-16

## 2024-11-26 ENCOUNTER — CLINICAL SUPPORT (OUTPATIENT)
Dept: PRIMARY CARE | Facility: CLINIC | Age: 74
End: 2024-11-26
Payer: MEDICARE

## 2024-11-26 DIAGNOSIS — Z23 FLU VACCINE NEED: ICD-10-CM

## 2024-11-26 PROCEDURE — 90662 IIV NO PRSV INCREASED AG IM: CPT | Performed by: INTERNAL MEDICINE

## 2024-11-26 PROCEDURE — G0008 ADMIN INFLUENZA VIRUS VAC: HCPCS | Performed by: INTERNAL MEDICINE

## 2025-01-07 DIAGNOSIS — F41.9 ANXIETY DISORDER, UNSPECIFIED: ICD-10-CM

## 2025-01-07 RX ORDER — BUPROPION HYDROCHLORIDE 150 MG/1
150 TABLET ORAL DAILY
Qty: 90 TABLET | Refills: 3 | Status: SHIPPED | OUTPATIENT
Start: 2025-01-07

## 2025-02-10 ENCOUNTER — TELEPHONE (OUTPATIENT)
Dept: PRIMARY CARE | Facility: CLINIC | Age: 75
End: 2025-02-10
Payer: MEDICARE

## 2025-02-10 NOTE — TELEPHONE ENCOUNTER
Patient is leaving town on Sunday. She has a a lingering head cold from traveling.  Sx- head cold, right side gland soreness.  She wants to come to be sure she doesn't have a sinus infection.    Tomorrow or Thursday?  Please advise  [phone]

## 2025-02-11 ENCOUNTER — OFFICE VISIT (OUTPATIENT)
Dept: PRIMARY CARE | Facility: CLINIC | Age: 75
End: 2025-02-11
Payer: MEDICARE

## 2025-02-11 VITALS
DIASTOLIC BLOOD PRESSURE: 81 MMHG | SYSTOLIC BLOOD PRESSURE: 135 MMHG | OXYGEN SATURATION: 97 % | TEMPERATURE: 97.6 F | HEART RATE: 80 BPM

## 2025-02-11 DIAGNOSIS — J34.89 RHINORRHEA: Primary | ICD-10-CM

## 2025-02-11 PROCEDURE — 99213 OFFICE O/P EST LOW 20 MIN: CPT | Performed by: INTERNAL MEDICINE

## 2025-02-11 PROCEDURE — 1159F MED LIST DOCD IN RCRD: CPT | Performed by: INTERNAL MEDICINE

## 2025-02-11 NOTE — PATIENT INSTRUCTIONS
Your exam is normal. No signs of infection. Suspect your post nasal drainage maybe related to cold dry weather   Recommend   AYR nasal gel   Nasal saline fine  Increase fluids

## 2025-02-11 NOTE — PROGRESS NOTES
"Subjective   Patient ID: Annie Golden is a 74 y.o. female who presents for Swollen Glands.    HPI   Annie is a healthy female who is being seen for rhinorrhea.  She had a Cold a couple of weeks ago for 3 days but recovered. She does have a \"persistent drip\".  In the am she is more congested.  Yesterday she had some Right side gland soreness and into her  right ear.  Today she feels better. She is leaving on a cruise for [place] this Sunday.    No facial pressure, no teeth pain   No fevers or chills.  Energy level is the same     She has a chronic post nasal drip in the winter.    She is sleeping with a humidifier     Review of Systems    Objective   /81 (BP Location: Left arm, Patient Position: Sitting)   Pulse 80   Temp 36.4 °C (97.6 °F)   SpO2 97%     Physical Exam  Vitals reviewed.   Constitutional:       General: She is not in acute distress.     Appearance: Normal appearance. She is not ill-appearing or toxic-appearing.   HENT:      Right Ear: Tympanic membrane and external ear normal.      Left Ear: Tympanic membrane and external ear normal.      Nose: Nose normal. No congestion or rhinorrhea.      Mouth/Throat:      Mouth: Mucous membranes are moist.      Pharynx: No oropharyngeal exudate or posterior oropharyngeal erythema.   Eyes:      Conjunctiva/sclera: Conjunctivae normal.   Cardiovascular:      Rate and Rhythm: Normal rate and regular rhythm.      Heart sounds: No murmur heard.     No gallop.   Pulmonary:      Effort: Pulmonary effort is normal. No respiratory distress.      Breath sounds: Normal breath sounds. No wheezing, rhonchi or rales.   Musculoskeletal:         General: Normal range of motion.      Cervical back: No tenderness.   Lymphadenopathy:      Head:      Right side of head: No submandibular adenopathy.      Left side of head: No submandibular adenopathy.      Cervical: Cervical adenopathy (small mobile left cervical node) present.   Skin:     Findings: No rash.   Neurological:      " General: No focal deficit present.      Mental Status: She is alert.         Assessment/Plan   Problem List Items Addressed This Visit             ICD-10-CM    Rhinorrhea - Primary J34.89     Your exam is normal. No signs of infection. Suspect your post nasal drainage maybe related to cold dry weather   Recommend   AYR nasal gel   Nasal saline fine  Increase fluids

## 2025-03-24 DIAGNOSIS — M17.10 PRIMARY OSTEOARTHRITIS OF KNEE, UNSPECIFIED LATERALITY: Primary | ICD-10-CM

## 2025-04-10 NOTE — PROGRESS NOTES
Urogynecology  Provider:  Yomaira Aguilar MD  [phone]    ASSESSMENT AND PLAN:   74 year old female with OAB, urethral caruncle, and vaginal atrophy.     Diagnoses:  #1 Urethral caruncle  #2 Vaginal atrophy   #3 Overactive bladder    Plan:  1. Urethral caruncle, vaginal atrophy  - We encouraged her to use E2 cream more consistently around 2x/week to help reduce vulvovaginal atrophy and this may improve her bladder control. She is amenable to restarting E2.    - Sent Rx refill of Estradiol cream to her preferred pharmacy @ [facility] on [address] in [place].     2. OAB, nocturia, UUI  - We advised her to stop drinking fluids 1-2 hours before bed to help reduce nocturia and use E2 cream more consistently as this may improve her bladder control by up to 30%.     Follow up in 1 year with Dr. Aguilar.     Scribe Attestation  By signing my name below, I, Linda Ellison, attest that this documentation has been prepared under the direction and in the presence of Yomaira Aguilar MD on 04/11/2025 at 4:12 PM.     Agree with above. I Dr. Aguilar, personally performed the services described in the documentation which was scribed virtually and confirm it is both complete and accurate.  Yomaira Aguilar MD        Problem List Items Addressed This Visit    None  Visit Diagnoses       Urinary disorder    -  Primary    Relevant Orders    POCT UA Automated manually resulted (Completed)    Vaginal atrophy        Relevant Medications    estradiol (Estrace) 0.01 % (0.1 mg/gram) vaginal cream (Start on 4/14/2025)                I spent a total of eConsult Time: 25 minutes in face to face and non face to face time.        Yomaira Aguilar MD        HISTORY OF PRESENT ILLNESS:   74 year old female presenting in follow up for a urethral caruncle and vaginal atrophy.     Records Review:   - Last visit 5/2024  72 y/o female presenting for routine exam.     Diagnoses:  #1 Annual exam     Plan:  Annual exam  - Patient is managing  vaginal dryness with intermittent use of estrogen cream effectively.  > No bleeding or significant discomfort has been reported since her last use in March.  - Her urethral caruncle is stable with no signs of bleeding or abnormalities.  - Continue current management strategy.  NO POP     Return in 1 year for an annual exam with Dr. Aguilar.      Vulvovaginal Symptoms:   - She reports using estrogen cream intermittently when experiencing vulvovaginal dryness or discomfort. However, the patient is open to using estrogen cream more regularly to improve vaginal health and reduce urinary symptoms/UUI.  - Her  has ED but is interested in trying to have penetrative intercourse with her in the future.     Urinary Symptoms:  - Patient experiences nocturia but reports drinking a lot of water before bed leading to urgency and occasional UUI leakage on her way to the bathroom.     Health Screenings:  - Her last mammogram was on 5/15/2024 that returned BI-RADS category 1 - negative. Of note, her annual MMG requisitions are managed by her PCP Dr. Mana Rasmussen.   - Last DEXA scan was on 5/23/2024 and she will be due for her next bone density scan in 2026.       Past Medical History:    Past Medical History:   Diagnosis Date    Abnormal uterine and vaginal bleeding, unspecified     ADD (attention deficit disorder) 05/04/2023    Anxiety disorder 05/04/2023    Chronic low back pain 03/04/2019    Osteoarthritis of knee     Urethral caruncle           Past Surgical History:     No past surgical history on file.      Medications:     Prior to Admission medications    Medication Sig Start Date End Date Taking? Authorizing Provider   B complex-vitamin C-folic acid (Nephro-Nathan Rx) 1- mg-mg-mcg tablet Take 1 tablet by mouth once daily with breakfast.    Historical Provider, MD   buPROPion XL (Wellbutrin XL) 150 mg 24 hr tablet TAKE 1 TABLET BY MOUTH EVERY DAY 1/7/25   Mana Rasmussen MD   cholecalciferol (Vitamin D-3) 125 MCG  (5000 UT) capsule Take by mouth.    Historical Provider, MD   cyanocobalamin (Vitamin B-12) 1,000 mcg/mL injection Inject 1 mL (1,000 mcg) into the muscle every 30 (thirty) days.    Historical Provider, MD   estradiol (Estrace) 0.01 % (0.1 mg/gram) vaginal cream Insert 0.5 Applicatorfuls (2 g) into the vagina 2 times a week. 8/13/20   Historical Provider, MD   L. acidophilus/Bifid. animalis 32 billion cell capsule Take 1 Capful by mouth once daily.    Historical Provider, MD BALL  Review of Systems   Constitutional: Negative.    HENT: Negative.     Eyes: Negative.    Respiratory: Negative.     Cardiovascular: Negative.    Gastrointestinal: Negative.    Endocrine: Negative.    Genitourinary: Negative.    Musculoskeletal: Negative.    Neurological: Negative.    Psychiatric/Behavioral: Negative.          Blood, Urine   Date Value Ref Range Status   05/14/2024 NEGATIVE NEGATIVE Final     Nitrite, Urine   Date Value Ref Range Status   05/14/2024 NEGATIVE NEGATIVE Final     Urobilinogen, Urine   Date Value Ref Range Status   05/14/2024 Normal Normal mg/dL Final         PHYSICAL EXAM:    /69 (BP Location: Left arm, Patient Position: Sitting)   Pulse 90   Ht 1.829 m (6')   Wt 70.5 kg (155 lb 6.4 oz)   BMI 21.08 kg/m²   No LMP recorded. Patient is postmenopausal.      Declines chaperone for physical exam.      Well developed, well nourished, in no apparent distress.   Neurologic/Psychiatric:  Awake, Alert and Oriented times 3.  Affect normal.     GENITAL/URINARY:     External Genitalia:  The patient has normal appearing external genitalia, normal skenes and bartholins glands, and a normal hair distribution.  Her vulva is without lesions, erythema or discharge.  It is non-tender with appropriate sensation.     Urethral Meatus: Urethral caruncle is stable and not friable, Location normal, Lesions absent,      Urethra:  Fullness absent, Masses absent. There is a urethral caruncle present, stable in size and is  "not bleeding. No excoriations over the urethral caruncle.     Bladder:  Fullness absent, Masses absent, Tenderness absent.     Vagina:  General appearance normal, Discharge absent, Lesions absent. Moderate vaginal atrophy. No foreign body. No pelvic organ prolapse.     Cervix: Normal, no discharge.   Uterus:  normal size, mobile, and nontender  Adnexa: normal, no masses or tenderness over the bilateral adnexa     Anus/Perineum:  Lesions absent and masses absent. No hemorrhoids. Normal appearing anus and perineum.     Stress urinary incontinence not demonstrable.         POP-Q:  Stage: 0  Position: supine lithotomy     Aa: -3       Ba: -3 C: -8   Gh:  Pb:  TVL: 10         Ap: -3 Bp: -3 D: -9             Rectum: Normal.         Data and DIAGNOSTIC STUDIES REVIEWED   Imaging  No results found.    Cardiology, Vascular, and Other Imaging  No other imaging results found for the past 7 days     No results found for: \"URINECULTURE\", \"UAMICCOMM\"   Lab Results   Component Value Date    GLUCOSE 83 05/14/2024    CALCIUM 9.3 05/14/2024     05/14/2024    K 4.4 05/14/2024    CO2 28 05/14/2024     05/14/2024    BUN 13 05/14/2024    CREATININE 0.62 05/14/2024     Lab Results   Component Value Date    WBC 4.2 (L) 05/14/2024    HGB 13.0 05/14/2024    HCT 40.2 05/14/2024    MCV 96 05/14/2024     05/14/2024          Yomaira Aguilar MD    "

## 2025-04-11 ENCOUNTER — OFFICE VISIT (OUTPATIENT)
Dept: OBSTETRICS AND GYNECOLOGY | Facility: CLINIC | Age: 75
End: 2025-04-11
Payer: MEDICARE

## 2025-04-11 VITALS
HEIGHT: 72 IN | WEIGHT: 155.4 LBS | DIASTOLIC BLOOD PRESSURE: 69 MMHG | HEART RATE: 90 BPM | BODY MASS INDEX: 21.05 KG/M2 | SYSTOLIC BLOOD PRESSURE: 116 MMHG

## 2025-04-11 DIAGNOSIS — N95.2 VAGINAL ATROPHY: ICD-10-CM

## 2025-04-11 DIAGNOSIS — N39.9 URINARY DISORDER: Primary | ICD-10-CM

## 2025-04-11 LAB
POC APPEARANCE, URINE: CLEAR
POC BILIRUBIN, URINE: NEGATIVE
POC BLOOD, URINE: NEGATIVE
POC COLOR, URINE: YELLOW
POC GLUCOSE, URINE: NEGATIVE MG/DL
POC KETONES, URINE: NEGATIVE MG/DL
POC LEUKOCYTES, URINE: NEGATIVE
POC NITRITE,URINE: NEGATIVE
POC PH, URINE: 6 PH
POC PROTEIN, URINE: NEGATIVE MG/DL
POC SPECIFIC GRAVITY, URINE: >=1.03
POC UROBILINOGEN, URINE: 0.2 EU/DL

## 2025-04-11 PROCEDURE — 1126F AMNT PAIN NOTED NONE PRSNT: CPT | Performed by: OBSTETRICS & GYNECOLOGY

## 2025-04-11 PROCEDURE — 1159F MED LIST DOCD IN RCRD: CPT | Performed by: OBSTETRICS & GYNECOLOGY

## 2025-04-11 PROCEDURE — 1036F TOBACCO NON-USER: CPT | Performed by: OBSTETRICS & GYNECOLOGY

## 2025-04-11 PROCEDURE — 81003 URINALYSIS AUTO W/O SCOPE: CPT | Mod: QW | Performed by: OBSTETRICS & GYNECOLOGY

## 2025-04-11 PROCEDURE — 99213 OFFICE O/P EST LOW 20 MIN: CPT | Performed by: OBSTETRICS & GYNECOLOGY

## 2025-04-11 RX ORDER — ESTRADIOL 0.1 MG/G
2 CREAM VAGINAL 2 TIMES WEEKLY
Qty: 42.5 G | Refills: 3 | Status: SHIPPED | OUTPATIENT
Start: 2025-04-14

## 2025-04-11 ASSESSMENT — ENCOUNTER SYMPTOMS
ENDOCRINE NEGATIVE: 1
NEUROLOGICAL NEGATIVE: 1
GASTROINTESTINAL NEGATIVE: 1
CONSTITUTIONAL NEGATIVE: 1
EYES NEGATIVE: 1
CARDIOVASCULAR NEGATIVE: 1
PSYCHIATRIC NEGATIVE: 1
RESPIRATORY NEGATIVE: 1
MUSCULOSKELETAL NEGATIVE: 1

## 2025-04-11 ASSESSMENT — PAIN SCALES - GENERAL: PAINLEVEL_OUTOF10: 0-NO PAIN

## 2025-05-01 DIAGNOSIS — Z00.00 HEALTHCARE MAINTENANCE: ICD-10-CM

## 2025-05-01 DIAGNOSIS — Z12.39 BREAST SCREENING: ICD-10-CM

## 2025-05-01 DIAGNOSIS — Z12.31 ENCOUNTER FOR SCREENING MAMMOGRAM FOR BREAST CANCER: ICD-10-CM

## 2025-05-08 ENCOUNTER — APPOINTMENT (OUTPATIENT)
Dept: OBSTETRICS AND GYNECOLOGY | Facility: CLINIC | Age: 75
End: 2025-05-08
Payer: MEDICARE

## 2025-05-12 ENCOUNTER — LAB (OUTPATIENT)
Dept: LAB | Facility: HOSPITAL | Age: 75
End: 2025-05-12
Payer: MEDICARE

## 2025-05-13 LAB
25(OH)D3+25(OH)D2 SERPL-MCNC: 78 NG/ML (ref 30–100)
ALBUMIN SERPL-MCNC: 4.4 G/DL (ref 3.6–5.1)
ALP SERPL-CCNC: 75 U/L (ref 37–153)
ALT SERPL-CCNC: 14 U/L (ref 6–29)
ANION GAP SERPL CALCULATED.4IONS-SCNC: 10 MMOL/L (CALC) (ref 7–17)
APPEARANCE UR: CLEAR
AST SERPL-CCNC: 21 U/L (ref 10–35)
BACTERIA #/AREA URNS HPF: ABNORMAL /HPF
BACTERIA UR CULT: ABNORMAL
BASOPHILS # BLD AUTO: 19 CELLS/UL (ref 0–200)
BASOPHILS NFR BLD AUTO: 0.5 %
BILIRUB SERPL-MCNC: 0.6 MG/DL (ref 0.2–1.2)
BILIRUB UR QL STRIP: NEGATIVE
BUN SERPL-MCNC: 15 MG/DL (ref 7–25)
CALCIUM SERPL-MCNC: 9.1 MG/DL (ref 8.6–10.4)
CHLORIDE SERPL-SCNC: 104 MMOL/L (ref 98–110)
CHOLEST SERPL-MCNC: 239 MG/DL
CHOLEST/HDLC SERPL: 2.8 (CALC)
CO2 SERPL-SCNC: 26 MMOL/L (ref 20–32)
COLOR UR: YELLOW
CREAT SERPL-MCNC: 0.61 MG/DL (ref 0.6–1)
CRP SERPL HS-MCNC: 0.6 MG/L
EGFRCR SERPLBLD CKD-EPI 2021: 93 ML/MIN/1.73M2
EOSINOPHIL # BLD AUTO: 70 CELLS/UL (ref 15–500)
EOSINOPHIL NFR BLD AUTO: 1.9 %
ERYTHROCYTE [DISTWIDTH] IN BLOOD BY AUTOMATED COUNT: 12.6 % (ref 11–15)
EST. AVERAGE GLUCOSE BLD GHB EST-MCNC: 108 MG/DL
EST. AVERAGE GLUCOSE BLD GHB EST-SCNC: 6 MMOL/L
GLUCOSE SERPL-MCNC: 90 MG/DL (ref 65–99)
GLUCOSE UR QL STRIP: NEGATIVE
HBA1C MFR BLD: 5.4 %
HCT VFR BLD AUTO: 40 % (ref 35–45)
HDLC SERPL-MCNC: 86 MG/DL
HGB BLD-MCNC: 12.9 G/DL (ref 11.7–15.5)
HGB UR QL STRIP: NEGATIVE
HYALINE CASTS #/AREA URNS LPF: ABNORMAL /LPF
KETONES UR QL STRIP: NEGATIVE
LDLC SERPL CALC-MCNC: 134 MG/DL (CALC)
LEUKOCYTE ESTERASE UR QL STRIP: ABNORMAL
LYMPHOCYTES # BLD AUTO: 1510 CELLS/UL (ref 850–3900)
LYMPHOCYTES NFR BLD AUTO: 40.8 %
MCH RBC QN AUTO: 31 PG (ref 27–33)
MCHC RBC AUTO-ENTMCNC: 32.3 G/DL (ref 32–36)
MCV RBC AUTO: 96.2 FL (ref 80–100)
MONOCYTES # BLD AUTO: 266 CELLS/UL (ref 200–950)
MONOCYTES NFR BLD AUTO: 7.2 %
NEUTROPHILS # BLD AUTO: 1835 CELLS/UL (ref 1500–7800)
NEUTROPHILS NFR BLD AUTO: 49.6 %
NITRITE UR QL STRIP: NEGATIVE
NONHDLC SERPL-MCNC: 153 MG/DL (CALC)
PH UR STRIP: 6.5 [PH] (ref 5–8)
PLATELET # BLD AUTO: 241 THOUSAND/UL (ref 140–400)
PMV BLD REES-ECKER: 10.1 FL (ref 7.5–12.5)
POTASSIUM SERPL-SCNC: 4 MMOL/L (ref 3.5–5.3)
PROT SERPL-MCNC: 6.8 G/DL (ref 6.1–8.1)
PROT UR QL STRIP: NEGATIVE
RBC # BLD AUTO: 4.16 MILLION/UL (ref 3.8–5.1)
RBC #/AREA URNS HPF: ABNORMAL /HPF
SERVICE CMNT-IMP: ABNORMAL
SODIUM SERPL-SCNC: 140 MMOL/L (ref 135–146)
SP GR UR STRIP: 1.02 (ref 1–1.03)
SQUAMOUS #/AREA URNS HPF: ABNORMAL /HPF
TRIGL SERPL-MCNC: 93 MG/DL
TSH SERPL-ACNC: 3.74 MIU/L (ref 0.4–4.5)
WBC # BLD AUTO: 3.7 THOUSAND/UL (ref 3.8–10.8)
WBC #/AREA URNS HPF: ABNORMAL /HPF

## 2025-05-15 ENCOUNTER — APPOINTMENT (OUTPATIENT)
Dept: OBSTETRICS AND GYNECOLOGY | Facility: CLINIC | Age: 75
End: 2025-05-15
Payer: MEDICARE

## 2025-05-16 ENCOUNTER — APPOINTMENT (OUTPATIENT)
Dept: RADIOLOGY | Facility: CLINIC | Age: 75
End: 2025-05-16
Payer: MEDICARE

## 2025-05-17 NOTE — PROGRESS NOTES
"Physical Exam    Name Annie Golden    Date of Service :5/21/2025      Annie Golden is a 74 y.o. year old female who is being seen for a Medicare Wellness and [facility] Physical   Health Risk Assessment  In general, health is:  good     HLD - myalgias on Crestor 5 mg   and pravastatin . ( Myalgias Wake her up at night )   CT cardiac score 78 in LAD 5/13/22   Osteopenia - dietary calcium and vitamin D   Multiple pulmonary nodules non smoker CT Chest 5/10/24 stable for 2 years no follow up needed   OAB, Urethral carbuncle, vaginal atrophy - (Dr. Aguilar )     Current exercise habits:   Nothing regular. She is always active.   Used to bicycle      Dietary issues discussed: Yes balanced. low carbs, low snacking, lots of fish and chicken. fruits and vegetables    Cardiac Risk Assessment  Cardiovascular risk was discussed and, if needed, lifestyle modifications recommended, including nutritional choices, exercise, and elimination of habits contributing to risk. We agreed on a plan to reduce the current cardiovascular risk based on above discussion as needed.  Aspirin use/disuse was discussed after reviewing the updated guidelines :    Hearing difficulties: No  Visual Acuity assessed: yes  current     In the past year have you fallen or had a near fall?:No    Activities of Daily Living  Needs help with grocery shopping, cooking, housework, bathing, grooming, dressing, eating, sitting or standing, walking, using the toilet, handling finances, taking medications, using the telephone, or driving:  No     Safe in current home environment: yes  Concerns with balance:  a little     Following safety precautions in the home environment and vehicle: removed throw rugs from floors, installed grab bars in the bathroom, handrails in stairwells, having adequate lighting, wearing seatbelt at all times?:  Yes     Depression Screen  (Note: if answer to either of the following is \"Yes\", then a more complete depression screening is indicated) "   Q1: Over the past two weeks, have you felt down, depressed or hopeless? Yes  Q2: Over the past two weeks, have you felt little interest or pleasure in doing things? No    Social History[1]  Social History     Social History Narrative     age 21,  to [place], 2 adult sons. 40,  41 both p[employer] is family business. she was ex. [employer]. One son is going to leave the [employer].     spends the summer in [place]. late May and returns late October/early November     one son in [facility] and one in [place].  three granddaughters range from age 2 years to 8 years     lifelong non smoker    wine drinker with dinner     exercise routine- she stretches and walks regularly     Diet - balanced. low carbs, low snacking, lots of fish and chicken. fruits and vegetables      Average alcohol consumption:  a few drinks a week    Current Providers  Specialists: I have reviewed specialist-related care of the patient in the medical record.    Opioid use review  Patient use of opioids:  None      Cognitive screening  Mini Cog Score:   refuses      Cognitive screening reviewed and plan:  n/a     Functional Observation  Was the patient's timed Up & Go test unsteady or >= 12 seconds?  No      Advance Care Planning  End of Life planning discussed, including patient's advanced directive wishes:  asked to bring in documents     ---------------      Medical/Family history review  Reviewed and updated problem list, medical/surgical/family/social history, medications, and allergies.    Problem List[2]     Medical History[3]     Surgical History[4]      Family History[5]     Medications and Supplements  prescribed by me and other practitioners or clinical pharmacist (such as prescriptions, OTC's, herbal therapies and supplements) were reviewed and documented in the medical record.      Current Medications[6]    Allergies[7]    ROUTINE:     Immunizations- up to date     Mammogram: last  "completed 5/15/24   Scheduled tomorrow   GYN EXAM: 4/11/25  ( Dr. Aguilar)  COLONOSCOPY:5/10/22  no specimens.  No repeat due to age   DEXA: 5/23/24  lowest T score -1.0 in left femur   continue vitamin D and weight bearing exercises . No xtra calcium     OPHTHALMOLOGY: current.  Every 6 months  watching her cataracts. Dr. Magnolia Romo   Dentist-  has scheduled tomorrow   Derm-  completed     Her main health concerns today  .  Continued left knee pain  Able to walk in [place] okay. Not interfering with her activity.   Discussed her tendency to have moderate anxiety.  ( MITCHELL 7 completed today ) .  had some health issues over the year    Review of Systems     /82 (BP Location: Left arm, Patient Position: Sitting)   Pulse 84   Temp 36.9 °C (98.5 °F)   Ht 1.854 m (6' 1\")   Wt 70.8 kg (156 lb 2 oz)   SpO2 97%   BMI 20.60 kg/m²  Body mass index is 20.6 kg/m².    Physical Exam  Constitutional:       General: She is not in acute distress.     Appearance: Normal appearance. She is not ill-appearing.   HENT:      Right Ear: Tympanic membrane and ear canal normal.      Left Ear: Tympanic membrane and ear canal normal.   Eyes:      Extraocular Movements: Extraocular movements intact.      Conjunctiva/sclera: Conjunctivae normal.   Cardiovascular:      Rate and Rhythm: Normal rate.      Pulses: Normal pulses.      Heart sounds: Normal heart sounds. No murmur heard.  Pulmonary:      Effort: Pulmonary effort is normal. No respiratory distress.      Breath sounds: Normal breath sounds. No wheezing, rhonchi or rales.   Chest:   Breasts:     Right: Normal. No mass, nipple discharge or skin change.      Left: Normal. No mass, nipple discharge or skin change.   Abdominal:      General: Bowel sounds are normal. There is no distension.      Palpations: There is no mass.      Tenderness: There is no abdominal tenderness. There is no guarding.   Musculoskeletal:         General: Normal range of motion.   Lymphadenopathy:    "   Cervical: No cervical adenopathy.      Upper Body:      Right upper body: No supraclavicular or axillary adenopathy.      Left upper body: No supraclavicular or axillary adenopathy.      Lower Body: No right inguinal adenopathy. No left inguinal adenopathy.   Skin:     General: Skin is warm and dry.      Findings: No rash.      Comments: No suspicious rashes or lesions   Neurological:      General: No focal deficit present.      Mental Status: She is alert.   Psychiatric:         Mood and Affect: Mood normal.         RESULTS/DATA:  Reviewed Standard Labs for this physical with patient ( any significant issues addressed in A/P )     ECG: NSR with minimal right precordial repolarization disturbance . Normal variant ECG       Assessment/Plan   HLD  Anxiety  Left knee pain  Osteopenia-  restart strength training   Routine:  mammogram scheduled for tomorrow. Current on all immunizations, eye, skin and dental exams. Current on preventative screenings         Follow up in 6 months     We discussed not starting any other cholesterol medications at this point. And have you focus on lifestyle management with regular exercise    Exercise is shown to favorably impact multiple health outcomes and decreases all cause mortality.  In addition, exercise may provide a modest protection against some cancers.  The general recommendation is a goal of  at least 30 minutes of moderate activity most days of the week to include strength training as well.     If you decide [place] please let [place] know where to fax the order     We also discussed trying sertraline for your heightened anxiety and worry.  Once a day. Please keep me posted     At your convenience forward a copy of your health care power of  and living will so we can scan to your chart      Follow up in 6 months       Mana Rasmussen MD                 [1]   Social History  Tobacco Use    Smoking status: Never    Smokeless  tobacco: Never   Substance Use Topics    Alcohol use: Yes     Alcohol/week: 3.0 standard drinks of alcohol     Types: 3 Glasses of wine per week    Drug use: Never   [2]   Patient Active Problem List  Diagnosis    Osteopenia    Hyperlipidemia    Atrophic vaginitis    Medicare annual wellness visit, subsequent    Multiple pulmonary nodules    Rhinorrhea   [3]   Past Medical History:  Diagnosis Date    Abnormal uterine and vaginal bleeding, unspecified     ADD (attention deficit disorder) 05/04/2023    Anxiety disorder 05/04/2023    Chronic low back pain 03/04/2019    Osteoarthritis of knee     Urethral caruncle    [4] No past surgical history on file.  [5]   Family History  Problem Relation Name Age of Onset    Epilepsy Mother          d.96    Heart failure Mother      Schizophrenia Mother      Osteoporosis Mother      Hepatitis Father          d. 59 ( not alcohol)    Stroke Maternal Grandmother          d. 99    Stroke Maternal Grandfather      Heart attack Maternal Grandfather          d. 88   [6]   Current Outpatient Medications:     B complex-vitamin C-folic acid (Nephro-Nathan Rx) 1- mg-mg-mcg tablet, Take 1 tablet by mouth once daily with breakfast., Disp: , Rfl:     buPROPion XL (Wellbutrin XL) 150 mg 24 hr tablet, TAKE 1 TABLET BY MOUTH EVERY DAY, Disp: 90 tablet, Rfl: 3    cholecalciferol (Vitamin D-3) 125 MCG (5000 UT) capsule, Take by mouth., Disp: , Rfl:     cyanocobalamin (Vitamin B-12) 1,000 mcg/mL injection, Inject 1 mL (1,000 mcg) into the muscle every 30 (thirty) days., Disp: , Rfl:     estradiol (Estrace) 0.01 % (0.1 mg/gram) vaginal cream, Insert 0.5 Applicatorfuls (2 g) into the vagina 2 times a week., Disp: 42.5 g, Rfl: 3    L. acidophilus/Bifid. animalis 32 billion cell capsule, Take 1 Capful by mouth once daily., Disp: , Rfl:     sertraline (Zoloft) 50 mg tablet, Take 1 tablet (50 mg) by mouth once daily., Disp: 90 tablet, Rfl: 0  [7] No Known Allergies

## 2025-05-21 ENCOUNTER — APPOINTMENT (OUTPATIENT)
Dept: PRIMARY CARE | Facility: CLINIC | Age: 75
End: 2025-05-21
Payer: MEDICARE

## 2025-05-21 VITALS
HEART RATE: 84 BPM | DIASTOLIC BLOOD PRESSURE: 82 MMHG | WEIGHT: 156.13 LBS | SYSTOLIC BLOOD PRESSURE: 124 MMHG | OXYGEN SATURATION: 97 % | TEMPERATURE: 98.5 F | BODY MASS INDEX: 21.15 KG/M2 | HEIGHT: 72 IN

## 2025-05-21 DIAGNOSIS — F41.9 ANXIETY: ICD-10-CM

## 2025-05-21 DIAGNOSIS — Z00.00 MEDICARE ANNUAL WELLNESS VISIT, SUBSEQUENT: Primary | ICD-10-CM

## 2025-05-21 DIAGNOSIS — Z00.00 ANNUAL PHYSICAL EXAM: ICD-10-CM

## 2025-05-21 RX ORDER — SERTRALINE HYDROCHLORIDE 50 MG/1
50 TABLET, FILM COATED ORAL DAILY
Qty: 90 TABLET | Refills: 0 | Status: SHIPPED | OUTPATIENT
Start: 2025-05-21 | End: 2025-08-19

## 2025-05-21 ASSESSMENT — ENCOUNTER SYMPTOMS
OCCASIONAL FEELINGS OF UNSTEADINESS: 0
DEPRESSION: 1
LOSS OF SENSATION IN FEET: 0

## 2025-05-21 ASSESSMENT — ANXIETY QUESTIONNAIRES
6. BECOMING EASILY ANNOYED OR IRRITABLE: MORE THAN HALF THE DAYS
4. TROUBLE RELAXING: NOT AT ALL
2. NOT BEING ABLE TO STOP OR CONTROL WORRYING: MORE THAN HALF THE DAYS
GAD7 TOTAL SCORE: 12
5. BEING SO RESTLESS THAT IT IS HARD TO SIT STILL: SEVERAL DAYS
1. FEELING NERVOUS, ANXIOUS, OR ON EDGE: SEVERAL DAYS
IF YOU CHECKED OFF ANY PROBLEMS ON THIS QUESTIONNAIRE, HOW DIFFICULT HAVE THESE PROBLEMS MADE IT FOR YOU TO DO YOUR WORK, TAKE CARE OF THINGS AT HOME, OR GET ALONG WITH OTHER PEOPLE: SOMEWHAT DIFFICULT
3. WORRYING TOO MUCH ABOUT DIFFERENT THINGS: NEARLY EVERY DAY
7. FEELING AFRAID AS IF SOMETHING AWFUL MIGHT HAPPEN: NEARLY EVERY DAY

## 2025-05-21 NOTE — PATIENT INSTRUCTIONS
We discussed not starting any other cholesterol medications at this point. And have you focus on lifestyle management with regular exercise    Exercise is shown to favorably impact multiple health outcomes and decreases all cause mortality.  In addition, exercise may provide a modest protection against some cancers.  The general recommendation is a goal of  at least 30 minutes of moderate activity most days of the week to include strength training as well.     If you decide to [place] please let [place] know where to fax the order     We also discussed trying sertraline for your heightened anxiety and worry.  Once a day. Please keep me posted     At your convenience forward a copy of your health care power of  and living will so we can scan to your chart      Follow up in 6 months

## 2025-05-22 ENCOUNTER — HOSPITAL ENCOUNTER (OUTPATIENT)
Dept: RADIOLOGY | Facility: CLINIC | Age: 75
Discharge: HOME | End: 2025-05-22
Payer: MEDICARE

## 2025-05-22 DIAGNOSIS — Z12.39 BREAST SCREENING: ICD-10-CM

## 2025-05-22 DIAGNOSIS — Z12.31 ENCOUNTER FOR SCREENING MAMMOGRAM FOR BREAST CANCER: ICD-10-CM

## 2025-05-22 PROCEDURE — 77067 SCR MAMMO BI INCL CAD: CPT

## 2025-08-29 ENCOUNTER — TELEPHONE (OUTPATIENT)
Dept: PRIMARY CARE | Facility: CLINIC | Age: 75
End: 2025-08-29
Payer: MEDICARE

## 2025-08-29 DIAGNOSIS — F41.9 ANXIETY: ICD-10-CM

## 2025-08-29 RX ORDER — SERTRALINE HYDROCHLORIDE 50 MG/1
50 TABLET, FILM COATED ORAL DAILY
Qty: 90 TABLET | Refills: 0 | Status: SHIPPED | OUTPATIENT
Start: 2025-08-29 | End: 2025-11-27